# Patient Record
Sex: FEMALE | Race: OTHER | HISPANIC OR LATINO | URBAN - METROPOLITAN AREA
[De-identification: names, ages, dates, MRNs, and addresses within clinical notes are randomized per-mention and may not be internally consistent; named-entity substitution may affect disease eponyms.]

---

## 2017-07-01 ENCOUNTER — OUTPATIENT (OUTPATIENT)
Dept: OUTPATIENT SERVICES | Facility: HOSPITAL | Age: 67
LOS: 1 days | End: 2017-07-01
Payer: MEDICAID

## 2017-07-12 DIAGNOSIS — R69 ILLNESS, UNSPECIFIED: ICD-10-CM

## 2017-09-01 PROCEDURE — G9001: CPT

## 2018-10-01 ENCOUNTER — OUTPATIENT (OUTPATIENT)
Dept: OUTPATIENT SERVICES | Facility: HOSPITAL | Age: 68
LOS: 1 days | End: 2018-10-01
Payer: MEDICARE

## 2018-10-01 PROCEDURE — G9001: CPT

## 2018-10-19 DIAGNOSIS — Z71.89 OTHER SPECIFIED COUNSELING: ICD-10-CM

## 2023-11-01 ENCOUNTER — EMERGENCY (EMERGENCY)
Facility: HOSPITAL | Age: 73
LOS: 0 days | Discharge: HOME | End: 2023-11-01
Payer: MEDICAID

## 2023-11-01 VITALS — TEMPERATURE: 98 F | SYSTOLIC BLOOD PRESSURE: 167 MMHG | HEART RATE: 78 BPM | DIASTOLIC BLOOD PRESSURE: 89 MMHG

## 2023-11-01 VITALS
HEART RATE: 80 BPM | HEIGHT: 59 IN | DIASTOLIC BLOOD PRESSURE: 78 MMHG | SYSTOLIC BLOOD PRESSURE: 189 MMHG | TEMPERATURE: 98 F | RESPIRATION RATE: 18 BRPM | WEIGHT: 125 LBS | OXYGEN SATURATION: 99 %

## 2023-11-01 DIAGNOSIS — M25.531 PAIN IN RIGHT WRIST: ICD-10-CM

## 2023-11-01 DIAGNOSIS — Z23 ENCOUNTER FOR IMMUNIZATION: ICD-10-CM

## 2023-11-01 DIAGNOSIS — S01.511A LACERATION WITHOUT FOREIGN BODY OF LIP, INITIAL ENCOUNTER: ICD-10-CM

## 2023-11-01 DIAGNOSIS — S52.501A UNSPECIFIED FRACTURE OF THE LOWER END OF RIGHT RADIUS, INITIAL ENCOUNTER FOR CLOSED FRACTURE: ICD-10-CM
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
## 2023-11-01 DIAGNOSIS — E11.9 TYPE 2 DIABETES MELLITUS WITHOUT COMPLICATIONS: ICD-10-CM

## 2023-11-01 DIAGNOSIS — E78.5 HYPERLIPIDEMIA, UNSPECIFIED: ICD-10-CM

## 2023-11-01 DIAGNOSIS — I10 ESSENTIAL (PRIMARY) HYPERTENSION: ICD-10-CM

## 2023-11-01 PROCEDURE — 73130 X-RAY EXAM OF HAND: CPT | Mod: 26,RT

## 2023-11-01 PROCEDURE — 70450 CT HEAD/BRAIN W/O DYE: CPT | Mod: 26,MA

## 2023-11-01 PROCEDURE — 99285 EMERGENCY DEPT VISIT HI MDM: CPT | Mod: 25

## 2023-11-01 PROCEDURE — 70486 CT MAXILLOFACIAL W/O DYE: CPT | Mod: 26,MA

## 2023-11-01 PROCEDURE — 73110 X-RAY EXAM OF WRIST: CPT | Mod: 26,RT

## 2023-11-01 PROCEDURE — 73100 X-RAY EXAM OF WRIST: CPT | Mod: 26,59,RT

## 2023-11-01 PROCEDURE — 12013 RPR F/E/E/N/L/M 2.6-5.0 CM: CPT

## 2023-11-01 PROCEDURE — 73090 X-RAY EXAM OF FOREARM: CPT | Mod: 26,RT

## 2023-11-01 RX ORDER — TETANUS TOXOID, REDUCED DIPHTHERIA TOXOID AND ACELLULAR PERTUSSIS VACCINE, ADSORBED 5; 2.5; 8; 8; 2.5 [IU]/.5ML; [IU]/.5ML; UG/.5ML; UG/.5ML; UG/.5ML
0.5 SUSPENSION INTRAMUSCULAR ONCE
Refills: 0 | Status: COMPLETED | OUTPATIENT
Start: 2023-11-01 | End: 2023-11-01

## 2023-11-01 RX ORDER — ACETAMINOPHEN 500 MG
1000 TABLET ORAL ONCE
Refills: 0 | Status: COMPLETED | OUTPATIENT
Start: 2023-11-01 | End: 2023-11-01

## 2023-11-01 RX ADMIN — Medication 400 MILLIGRAM(S): at 17:11

## 2023-11-01 RX ADMIN — TETANUS TOXOID, REDUCED DIPHTHERIA TOXOID AND ACELLULAR PERTUSSIS VACCINE, ADSORBED 0.5 MILLILITER(S): 5; 2.5; 8; 8; 2.5 SUSPENSION INTRAMUSCULAR at 17:11

## 2023-11-01 RX ADMIN — Medication 1 TABLET(S): at 22:07

## 2023-11-01 NOTE — ED ADULT NURSE NOTE - OBJECTIVE STATEMENT
tripped and fell, sustained lower lip laceration, on blood thinner, denies LOC patient alert and oriented x4, came in for fall. pt is Welsh speaking and prefers son for interpretation, as per son pt tripped over uneven surface and fell landing on face causing a laceration to the lower lip that is profusely bleeding being held with multiple gauze. pt denies losing any consciousness. pt usually walks OOB independent. pt also has complaints of right arm pain from fall. pt in no acuter respiratory distress or discomfort at this time. pmh of HTN, DM.

## 2023-11-01 NOTE — ED PROVIDER NOTE - PATIENT PORTAL LINK FT
You can access the FollowMyHealth Patient Portal offered by [facility] by registering at the following website: [email or web address]. By joining [employer]’s FollowMyHealth portal, you will also be able to view your health information using other applications (apps) compatible with our system.

## 2023-11-01 NOTE — ED PROVIDER NOTE - CLINICAL SUMMARY MEDICAL DECISION MAKING FREE TEXT BOX
73-year-old female with past medical history of hypertension, hyperlipidemia, and diabetes no longer on medications, renal disease, no known blood thinners per son presents emergency room for fall, face strike, lip laceration and right wrist pain.  Denies headache, dizziness, chest pain, shortness of breath or difficulty breathing prior to fall.  Last tetanus unknown. Vital signs stable, R hand/wrist tenderness, lip laceration repaired, see note, tetanus updated, pending XR to r/o fracture, likely splint regardless given tenderness. 73-year-old female with past medical history of hypertension, hyperlipidemia, and diabetes no longer on medications, renal disease, no known blood thinners per son presents emergency room for fall, face strike, lip laceration and right wrist pain.  Denies headache, dizziness, chest pain, shortness of breath or difficulty breathing prior to fall.  Last tetanus unknown. Vital signs stable, R hand/wrist tenderness, lip laceration repaired, see note, tetanus updated, pending XR to r/o fracture, likely splint regardless given tenderness.    danisha - case finalized by ortho team. safe for SD home.

## 2023-11-01 NOTE — ED ADULT NURSE NOTE - NSFALLHARMRISKINTERV_ED_ALL_ED
Assistance OOB with selected safe patient handling equipment if applicable/Communicate risk of Fall with Harm to all staff, patient, and family/Provide visual cue: red socks, yellow wristband, yellow gown, etc/Reinforce activity limits and safety measures with patient and family/Bed in lowest position, wheels locked, appropriate side rails in place/Call bell, personal items and telephone in reach/Instruct patient to call for assistance before getting out of bed/chair/stretcher/Non-slip footwear applied when patient is off stretcher/[place] to call system/Physically safe environment - no spills, clutter or unnecessary equipment/Purposeful Proactive Rounding/Room/bathroom lighting operational, light cord in reach

## 2023-11-01 NOTE — ED PROVIDER NOTE - CARE PLAN
Principal Discharge DX:	Laceration of lip  Secondary Diagnosis:	Fall  Secondary Diagnosis:	Hand pain, right   1

## 2023-11-01 NOTE — ED PROVIDER NOTE - OBJECTIVE STATEMENT
73-year-old female with past medical history of hypertension, hyperlipidemia, and diabetes no longer on medications, renal disease, no known blood thinners per son presents emergency room for fall.  [place] [employer] when she had a mechanical trip and fall hitting her face.  Reporting lip laceration and right wrist pain.  Denies headache, dizziness, chest pain, shortness of breath or difficulty breathing prior to fall.  Last tetanus unknown.

## 2023-11-01 NOTE — ED PROVIDER NOTE - CARE PROVIDER_API CALL
Yousif Christie [place]  Orthopaedic Surgery  [address], [address]  [place] [address]  Phone: [phone]  Fax: [phone]  Follow Up Time: 4-6 Days

## 2023-11-01 NOTE — ED PROVIDER NOTE - PROGRESS NOTE DETAILS
CHRISTIANO Michael; nondisplaced distal R radius fracture, ortho aware, pending evaluation/dispo, likely splint and dc with outpatient follow up.

## 2023-11-01 NOTE — CONSULT NOTE ADULT - SUBJECTIVE AND OBJECTIVE BOX
Patient is a 73yFemale RHD who presents to [facility] ED w/ a c/o of right hand pain. Patient states that she was walking when she slipped and fell earlier in the day. Denies head strike or LOC, but did injure her lip on the fall.  Denies any numbness or tingling. Denies having any other pain elsewhere, aside from the R wrist and lip. No other orthopedic concerns at this time.    No pertinent past medical history    HTN (hypertension)    DM (diabetes mellitus)        No Known Allergies      PHYSICAL EXAM:  T(C): 36.5 (11-01-23 @ 13:30), Max: 36.5 (11-01-23 @ 13:30)  HR: 71 (11-01-23 @ 18:54) (71 - 80)  BP: 172/63 (11-01-23 @ 18:54) (172/63 - 189/78)  RR: 18 (11-01-23 @ 18:54) (18 - 18)  SpO2: 99% (11-01-23 @ 18:54) (99% - 99%)    Gen: NAD, Resting comfortably    RUE:  Skin intact with moderate ecchymosis and swelling  TTP over distal radius/radial styloid  SILT C5-T1  Motor: +AIN/PIN/Med/Rad/Uln/Msc/Ax  Compartments soft and compressible  2+ Radial Pulse    Secondary Assessment:  Injured lower lip with swelling, NTTP of clavicles, NTTP of C-,T-,L-Spine, NTTP of Pelvis  LUE: NTTP of Shoulder, Elbow, Wrists, Hand; NT with AROM/PROM of Shoulder, Elbow, Wrist, Hand; AIN/PIN/Med/Uln/Msc/Rad/Ax intact  RLE: Able to SLR, NT with Log Roll, NT with Heel Strike, NTTP of Hip, Knee, Ankle, Foot; NT with AROM/PROM of Hip, Knee, Ankle, Foot; Q/H/Gsc/TA/EHL/FHL intact  LLE: Able to SLR, NT with Log Roll, NT with Heel Strike, NTTP of Hip, Knee, Ankle, Foot; NT with AROM/PROM of Hip, Knee, Ankle, Foot; Q/H/Gsc/TA/EHL/FHL intact    Imaging: R distal radius fx    Procedure:  Under aseptic conditions, a hematoma block was administered to the fracture site using 10cc of 1% lidocaine. Closed reduction of the right wrist was performed and a well molded, well padded plaster sugar tong splint was applied. The patient tolerated the procedure well and there we no complications. The patient's post-reduction neurovascular exam was unchanged. Post-reduction xrays demonstrated acceptable alignment.    A/P: 73F who presents with a R distal radius fx    Analgesia as needed  NWB RUE in Sling  PT/OT  Ice and elevate as tolerated  Orthopedically stable for discharge  Follow up outpatient in office with Dr. Christie in 7-10 days  Discussed plan with Dr. Whitt who is in agreement with above plan

## 2023-11-01 NOTE — ED ADULT NURSE NOTE - NSFALLRISKASMTTYPE_ED_ALL_ED
Initial (On Arrival) Normal rate, regular rhythm.  Heart sounds S1, S2.  No murmurs, rubs or gallops. Normal rate, regular rhythm.  Heart sounds S1, S2.  No murmurs, rubs or gallops. 1+ pitting edema to b/l LE, chronic per son.

## 2023-11-14 PROBLEM — E11.9 TYPE 2 DIABETES MELLITUS WITHOUT COMPLICATIONS: Chronic | Status: ACTIVE | Noted: 2023-11-01

## 2023-11-14 PROBLEM — I10 ESSENTIAL (PRIMARY) HYPERTENSION: Chronic | Status: ACTIVE | Noted: 2023-11-01

## 2023-11-16 ENCOUNTER — APPOINTMENT (OUTPATIENT)
Dept: ORTHOPEDIC SURGERY | Facility: CLINIC | Age: 73
End: 2023-11-16
Payer: SELF-PAY

## 2023-11-16 DIAGNOSIS — Z87.448 PERSONAL HISTORY OF OTHER DISEASES OF URINARY SYSTEM: ICD-10-CM

## 2023-11-16 DIAGNOSIS — Z00.00 ENCOUNTER FOR GENERAL ADULT MEDICAL EXAMINATION W/OUT ABNORMAL FINDINGS: ICD-10-CM

## 2023-11-16 DIAGNOSIS — I10 ESSENTIAL (PRIMARY) HYPERTENSION: ICD-10-CM

## 2023-11-16 DIAGNOSIS — E11.9 TYPE 2 DIABETES MELLITUS W/OUT COMPLICATIONS: ICD-10-CM

## 2023-11-16 DIAGNOSIS — E78.00 PURE HYPERCHOLESTEROLEMIA, UNSPECIFIED: ICD-10-CM

## 2023-11-16 PROCEDURE — 25600 CLTX DST RDL FX/EPHYS SEP WO: CPT | Mod: RT

## 2023-11-16 PROCEDURE — 99204 OFFICE O/P NEW MOD 45 MIN: CPT

## 2023-11-16 PROCEDURE — 73110 X-RAY EXAM OF WRIST: CPT | Mod: RT

## 2023-11-16 RX ORDER — LOSARTAN POTASSIUM 100 MG/1
TABLET, FILM COATED ORAL
Refills: 0 | Status: ACTIVE | COMMUNITY

## 2023-11-16 RX ORDER — HYDROCHLOROTHIAZIDE 12.5 MG/1
12.5 CAPSULE ORAL
Refills: 0 | Status: ACTIVE | COMMUNITY

## 2023-11-16 RX ORDER — AMLODIPINE BESYLATE 5 MG/1
TABLET ORAL
Refills: 0 | Status: ACTIVE | COMMUNITY

## 2023-11-16 RX ORDER — ASPIRIN 325 MG/1
TABLET, FILM COATED ORAL
Refills: 0 | Status: ACTIVE | COMMUNITY

## 2023-11-16 RX ORDER — ATORVASTATIN CALCIUM 80 MG/1
TABLET, FILM COATED ORAL
Refills: 0 | Status: ACTIVE | COMMUNITY

## 2023-12-14 ENCOUNTER — APPOINTMENT (OUTPATIENT)
Dept: ORTHOPEDIC SURGERY | Facility: CLINIC | Age: 73
End: 2023-12-14
Payer: SELF-PAY

## 2023-12-14 DIAGNOSIS — S52.571A OTHER INTRAARTICULAR FRACTURE OF LOWER END OF RIGHT RADIUS, INITIAL ENCOUNTER FOR CLOSED FRACTURE: ICD-10-CM

## 2023-12-14 DIAGNOSIS — M25.631 STIFFNESS OF RIGHT WRIST, NOT ELSEWHERE CLASSIFIED: ICD-10-CM

## 2023-12-14 PROCEDURE — 73110 X-RAY EXAM OF WRIST: CPT | Mod: RT

## 2023-12-14 NOTE — HISTORY OF PRESENT ILLNESS
[de-identified] : 12/14/2023: Patient is here to follow up on right wrist. Patient notes improvement.   11/16/23:  Pt fell on 11/1/23 and fractured her right wrist.  Pt went to ER and presents in long arm splint.

## 2023-12-14 NOTE — ASSESSMENT
[FreeTextEntry1] : maintain brace x 2 more weeks. RTO in 2 mos for final xray and examination. OT rx provided for ROM / strengthening.

## 2023-12-14 NOTE — IMAGING
[Right] : right wrist [de-identified] : right wrist: swelling/ecchymosis minimal ttp over distal radius /  no fracture motion rom is mildly limited strength is 5/5 nvid  Right wrist xray: with solid callous with fx in acceptable alignment.   [FreeTextEntry8] : nondisplaced distal radius fracture

## 2024-02-15 ENCOUNTER — APPOINTMENT (OUTPATIENT)
Dept: ORTHOPEDIC SURGERY | Facility: CLINIC | Age: 74
End: 2024-02-15

## 2024-02-22 ENCOUNTER — APPOINTMENT (OUTPATIENT)
Dept: ORTHOPEDIC SURGERY | Facility: CLINIC | Age: 74
End: 2024-02-22

## 2025-03-14 NOTE — ED ADULT NURSE NOTE - NSFALLOOBATTEMPT_ED_ALL_ED

## 2025-04-16 ENCOUNTER — NON-APPOINTMENT (OUTPATIENT)
Age: 75
End: 2025-04-16